# Patient Record
Sex: MALE
[De-identification: names, ages, dates, MRNs, and addresses within clinical notes are randomized per-mention and may not be internally consistent; named-entity substitution may affect disease eponyms.]

---

## 2024-05-24 ENCOUNTER — LABORATORY RESULT (OUTPATIENT)
Age: 49
End: 2024-05-24

## 2024-05-24 ENCOUNTER — NON-APPOINTMENT (OUTPATIENT)
Age: 49
End: 2024-05-24

## 2024-05-24 ENCOUNTER — APPOINTMENT (OUTPATIENT)
Dept: INTERNAL MEDICINE | Facility: CLINIC | Age: 49
End: 2024-05-24
Payer: MEDICAID

## 2024-05-24 VITALS
SYSTOLIC BLOOD PRESSURE: 116 MMHG | TEMPERATURE: 97.1 F | HEART RATE: 73 BPM | OXYGEN SATURATION: 99 % | BODY MASS INDEX: 26.66 KG/M2 | HEIGHT: 69 IN | WEIGHT: 180 LBS | DIASTOLIC BLOOD PRESSURE: 77 MMHG

## 2024-05-24 DIAGNOSIS — I83.90 ASYMPTOMATIC VARICOSE VEINS OF UNSPECIFIED LOWER EXTREMITY: ICD-10-CM

## 2024-05-24 DIAGNOSIS — Z86.19 PERSONAL HISTORY OF OTHER INFECTIOUS AND PARASITIC DISEASES: ICD-10-CM

## 2024-05-24 DIAGNOSIS — Z78.9 OTHER SPECIFIED HEALTH STATUS: ICD-10-CM

## 2024-05-24 DIAGNOSIS — Z82.49 FAMILY HISTORY OF ISCHEMIC HEART DISEASE AND OTHER DISEASES OF THE CIRCULATORY SYSTEM: ICD-10-CM

## 2024-05-24 DIAGNOSIS — N44.2 BENIGN CYST OF TESTIS: ICD-10-CM

## 2024-05-24 DIAGNOSIS — Z00.00 ENCOUNTER FOR GENERAL ADULT MEDICAL EXAMINATION W/OUT ABNORMAL FINDINGS: ICD-10-CM

## 2024-05-24 DIAGNOSIS — M25.571 PAIN IN RIGHT ANKLE AND JOINTS OF RIGHT FOOT: ICD-10-CM

## 2024-05-24 DIAGNOSIS — D17.20 BENIGN LIPOMATOUS NEOPLASM OF SKIN AND SUBCUTANEOUS TISSUE OF UNSPECIFIED LIMB: ICD-10-CM

## 2024-05-24 PROCEDURE — 99386 PREV VISIT NEW AGE 40-64: CPT

## 2024-05-24 PROCEDURE — 36415 COLL VENOUS BLD VENIPUNCTURE: CPT

## 2024-05-24 PROCEDURE — 93000 ELECTROCARDIOGRAM COMPLETE: CPT

## 2024-05-24 NOTE — PLAN
[FreeTextEntry1] : #HCM - CBC with differential, HIV, Hep C, CMP, hemoglobin A1c, vitamin B12, vitamin D, urine analysis, lipid panel, TFTs all ordered. Patient to be informed of results. - Prostate cancer screening discussed. PSA blood test has been ordered. - The patient has screened negative for depression and excessive alcohol use. - The patient has never used tobacco products. - The [place] Preventive Service Task Force recommends yearly lung cancer screening with LDCT for people who have a 20 pack-year or more smoking history and smoke now or have quit within the past 15 years and are between 50 and 80 years old. - Blood pressure as taken in the office today is within normal limits (<120/<80). - Colon cancer screening was discussed at today's visit and GI referral has been provided. - EKG performed in the office today is within normal limits. - Counseled on maintaining a healthy body weight. It has been estimated that up to one-third of cardiovascular disease deaths may be preventable by healthy diet and physical activity.  - Annual eye exam and q6 month dental exams recommended.  #Varicose veins - Advised that he may need to see Vascular surgery if he develops swelling or pain. Recommend compression stockings.  #Right ankle pain - PT recommended. Imaging is not indicated at this time.

## 2024-05-24 NOTE — PHYSICAL EXAM
[Normal] : normal rate, regular rhythm, normal S1 and S2 and no murmur heard [de-identified] : Varicose veins noted in the RLE; no swelling noted [de-identified] : Refused testicular exam [de-identified] : Lipomas noted in the RUE

## 2024-05-24 NOTE — HEALTH RISK ASSESSMENT
[Good] : ~his/her~  mood as  good [No] : In the past 12 months have you used drugs other than those required for medical reasons? No [No falls in past year] : Patient reported no falls in the past year [0] : 2) Feeling down, depressed, or hopeless: Not at all (0) [HIV Test offered] : HIV Test offered [Hepatitis C test offered] : Hepatitis C test offered [With Family] : lives with family [] :  [Sexually Active] : sexually active [Feels Safe at Home] : Feels safe at home [Smoke Detector] : smoke detector [Carbon Monoxide Detector] : carbon monoxide detector [Seat Belt] :  uses seat belt [Sunscreen] : uses sunscreen [Never] : Never [PHQ-2 Negative - No further assessment needed] : PHQ-2 Negative - No further assessment needed [de-identified] : None [de-identified] : None [Audit-CScore] : 0 [de-identified] : Plays soccer [de-identified] : Tries to avoid sweets [CBD6Qapbo] : 0 [Change in mental status noted] : No change in mental status noted [Language] : denies difficulty with language [Behavior] : denies difficulty with behavior [Learning/Retaining New Information] : denies difficulty learning/retaining new information [Handling Complex Tasks] : denies difficulty handling complex tasks [Reasoning] : denies difficulty with reasoning [Spatial Ability and Orientation] : denies difficulty with spatial ability and orientation [None] : None [Employed] : employed [High Risk Behavior] : no high risk behavior [Reports changes in hearing] : Reports no changes in hearing [Reports changes in vision] : Reports no changes in vision [Reports changes in dental health] : Reports no changes in dental health [TB Exposure] : is not being exposed to tuberculosis [Caregiver Concerns] : does not have caregiver concerns [ColonoscopyComments] : Never did colonoscopy

## 2024-05-24 NOTE — REVIEW OF SYSTEMS
[Negative] : Heme/Lymph [FreeTextEntry9] : Varicose veins of RLE [de-identified] : Lipomas on right arm

## 2024-05-24 NOTE — HISTORY OF PRESENT ILLNESS
[de-identified] : 48 M presenting for a CPE. He has been doing well. Main concern today are varicose veins of the right lower extremity. He states it does not swell up or hurt him.

## 2024-05-28 ENCOUNTER — APPOINTMENT (OUTPATIENT)
Dept: INTERNAL MEDICINE | Facility: CLINIC | Age: 49
End: 2024-05-28
Payer: MEDICAID

## 2024-05-28 DIAGNOSIS — E78.1 PURE HYPERGLYCERIDEMIA: ICD-10-CM

## 2024-05-28 DIAGNOSIS — E55.9 VITAMIN D DEFICIENCY, UNSPECIFIED: ICD-10-CM

## 2024-05-28 DIAGNOSIS — N20.0 CALCULUS OF KIDNEY: ICD-10-CM

## 2024-05-28 DIAGNOSIS — R73.03 PREDIABETES.: ICD-10-CM

## 2024-05-28 LAB
25(OH)D3 SERPL-MCNC: 25.4 NG/ML
ALBUMIN SERPL ELPH-MCNC: 4.7 G/DL
ALP BLD-CCNC: 94 U/L
ALT SERPL-CCNC: 29 U/L
ANION GAP SERPL CALC-SCNC: 14 MMOL/L
APPEARANCE: CLEAR
AST SERPL-CCNC: 21 U/L
BASOPHILS # BLD AUTO: 0.03 K/UL
BASOPHILS NFR BLD AUTO: 0.6 %
BILIRUB SERPL-MCNC: 0.4 MG/DL
BILIRUBIN URINE: NEGATIVE
BLOOD URINE: NEGATIVE
BUN SERPL-MCNC: 18 MG/DL
CALCIUM SERPL-MCNC: 9 MG/DL
CHLORIDE SERPL-SCNC: 105 MMOL/L
CHOLEST SERPL-MCNC: 184 MG/DL
CO2 SERPL-SCNC: 21 MMOL/L
COLOR: NORMAL
CREAT SERPL-MCNC: 1.12 MG/DL
EGFR: 82 ML/MIN/1.73M2
EOSINOPHIL # BLD AUTO: 0.16 K/UL
EOSINOPHIL NFR BLD AUTO: 3.3 %
ESTIMATED AVERAGE GLUCOSE: 117 MG/DL
GLUCOSE QUALITATIVE U: NEGATIVE MG/DL
GLUCOSE SERPL-MCNC: 100 MG/DL
HBA1C MFR BLD HPLC: 5.7 %
HCT VFR BLD CALC: 44.5 %
HCV AB SER QL: NONREACTIVE
HCV S/CO RATIO: 0.09 S/CO
HDLC SERPL-MCNC: 54 MG/DL
HGB BLD-MCNC: 15.3 G/DL
HIV1+2 AB SPEC QL IA.RAPID: NONREACTIVE
IMM GRANULOCYTES NFR BLD AUTO: 0.2 %
KETONES URINE: NEGATIVE MG/DL
LDLC SERPL CALC-MCNC: 92 MG/DL
LEUKOCYTE ESTERASE URINE: NEGATIVE
LYMPHOCYTES # BLD AUTO: 1.53 K/UL
LYMPHOCYTES NFR BLD AUTO: 31.1 %
MAN DIFF?: NORMAL
MCHC RBC-ENTMCNC: 31.7 PG
MCHC RBC-ENTMCNC: 34.4 GM/DL
MCV RBC AUTO: 92.1 FL
MONOCYTES # BLD AUTO: 0.47 K/UL
MONOCYTES NFR BLD AUTO: 9.6 %
NEUTROPHILS # BLD AUTO: 2.72 K/UL
NEUTROPHILS NFR BLD AUTO: 55.2 %
NITRITE URINE: NEGATIVE
NONHDLC SERPL-MCNC: 130 MG/DL
PH URINE: 6
PLATELET # BLD AUTO: 236 K/UL
POTASSIUM SERPL-SCNC: 4.1 MMOL/L
PROT SERPL-MCNC: 7.1 G/DL
PROTEIN URINE: 30 MG/DL
PSA SERPL-MCNC: 0.5 NG/ML
RBC # BLD: 4.83 M/UL
RBC # FLD: 12.9 %
SODIUM SERPL-SCNC: 140 MMOL/L
SPECIFIC GRAVITY URINE: >1.03
TRIGL SERPL-MCNC: 226 MG/DL
TSH SERPL-ACNC: 3.09 UIU/ML
UROBILINOGEN URINE: 1 MG/DL
VIT B12 SERPL-MCNC: 426 PG/ML
WBC # FLD AUTO: 4.92 K/UL

## 2024-05-28 PROCEDURE — 99443: CPT

## 2024-05-28 NOTE — HISTORY OF PRESENT ILLNESS
[Home] : at home, [unfilled] , at the time of the visit. [Medical Office: ([place] [facility])___] : at the medical office located in  [Verbal consent obtained from patient] : the patient, [unfilled] [de-identified] : 48 M presenting for blood work review. Noted to have elevated TG and A1c of 5.7%. He eats a lot of bread and rice. Never told he had this issue before.

## 2024-05-28 NOTE — PLAN
[FreeTextEntry1] : #Elevated TG - Counseled extensively on lifestyle changes such as limiting carbohydrate intake.   #Pre-diabetes - Counseled extensively on lifestyle changes such as limiting carbohydrate intake.   #Calcium oxalates in urine - Patient advised to drink plenty of water given that specific gravity was elevated as well.

## 2024-06-24 ENCOUNTER — APPOINTMENT (OUTPATIENT)
Age: 49
End: 2024-06-24

## 2024-06-30 ENCOUNTER — NON-APPOINTMENT (OUTPATIENT)
Age: 49
End: 2024-06-30

## 2024-07-01 ENCOUNTER — APPOINTMENT (OUTPATIENT)
Age: 49
End: 2024-07-01
Payer: MEDICAID

## 2024-07-01 VITALS
DIASTOLIC BLOOD PRESSURE: 71 MMHG | HEART RATE: 92 BPM | WEIGHT: 180 LBS | SYSTOLIC BLOOD PRESSURE: 111 MMHG | OXYGEN SATURATION: 96 % | BODY MASS INDEX: 26.66 KG/M2 | HEIGHT: 69 IN

## 2024-07-01 DIAGNOSIS — S83.421A SPRAIN OF LATERAL COLLATERAL LIGAMENT OF RIGHT KNEE, INITIAL ENCOUNTER: ICD-10-CM

## 2024-07-01 PROCEDURE — 99203 OFFICE O/P NEW LOW 30 MIN: CPT | Mod: 25

## 2024-07-01 PROCEDURE — 73564 X-RAY EXAM KNEE 4 OR MORE: CPT | Mod: RT

## 2024-08-12 ENCOUNTER — APPOINTMENT (OUTPATIENT)
Age: 49
End: 2024-08-12
Payer: MEDICAID

## 2024-08-12 VITALS
BODY MASS INDEX: 26.66 KG/M2 | OXYGEN SATURATION: 99 % | HEART RATE: 78 BPM | HEIGHT: 69 IN | DIASTOLIC BLOOD PRESSURE: 71 MMHG | SYSTOLIC BLOOD PRESSURE: 115 MMHG | WEIGHT: 180 LBS

## 2024-08-12 DIAGNOSIS — S93.491A SPRAIN OF OTHER LIGAMENT OF RIGHT ANKLE, INITIAL ENCOUNTER: ICD-10-CM

## 2024-08-12 DIAGNOSIS — M25.461 EFFUSION, RIGHT KNEE: ICD-10-CM

## 2024-08-12 DIAGNOSIS — M19.071 PRIMARY OSTEOARTHRITIS, RIGHT ANKLE AND FOOT: ICD-10-CM

## 2024-08-12 PROCEDURE — 99213 OFFICE O/P EST LOW 20 MIN: CPT | Mod: 25

## 2024-08-12 PROCEDURE — 73610 X-RAY EXAM OF ANKLE: CPT | Mod: RT

## 2024-08-12 NOTE — PHYSICAL EXAM
[de-identified] : Right lower extremity - Skin intact - Knee range of motion 0-130 - Knee stable varus valgus stress at 0 and 30 degrees - Negative Lachman negative anterior posterior drawer  -no pain with varus stress - Leg warm well-perfused - Sensation tact light touch distally - Palpable effusion right knee [de-identified] : X-rays of the right ankle obtained 3 views: Demonstrate no fractures or dislocations.  There is some anterior bone spurs off the tibia that may be causing impingement.  Consistent with mild to moderate arthritic changes

## 2024-08-12 NOTE — ASSESSMENT
[FreeTextEntry1] : 48-year-old male with right knee effusion and right knee LCL sprain and right ankle ATFL sprain and exacerbation of right ankle arthritis - Long discussion with patient about his injuries.  Discussed with patient that retches right knee he likely reaggravated his LCL sprain potentially aggravated underlying arthritis.  Patient has been in physical therapy was having some improvement has had a step back.  Due to the recurrence of the pain failed physical therapy would recommend MRI of the right knee to further evaluate the integrity of his ligaments and possible underlying arthritic lesions. - Regards to patient's ankle he has history of multiple ankle sprains in the past.  He has tenderness of the ATFL.  He has evidence of some arthritic changes on x-ray.  Recommend MRI of the right ankle to further evaluate for any osteochondral lesions and to evaluate the degree of ankle arthritis.  Patient will be scheduled for MRIs.  Follow-up after MRIs are completed. - Patient to continue anti-inflammatories for pain.  Ice elevation and Ace wrapping as well.

## 2024-08-12 NOTE — HISTORY OF PRESENT ILLNESS
[de-identified] : 48-year-old male presents with right knee pain.  Patient states that about 3 weeks ago while playing soccer cutting he felt a crack in the outside of his knee.  Had minimal swelling after that.  He was limping initially.  His pain has improved since injury.  He has been taking ibuprofen and Tylenol.  Patient has a history of a left ACL tear and he underwent reconstruction in 2009 8/12/2024: Patient returns today with recurrence of right knee pain.  He is overall feeling better and started to play soccer again.  Since playing soccer again about a week or so ago he developed discomfort in his right knee.  Reports an effusion.  He also complains of pain in his right ankle.  Has complaints of swelling in his ankle.

## 2024-09-10 ENCOUNTER — APPOINTMENT (OUTPATIENT)
Dept: INTERNAL MEDICINE | Facility: CLINIC | Age: 49
End: 2024-09-10
Payer: MEDICAID

## 2024-09-10 ENCOUNTER — TRANSCRIPTION ENCOUNTER (OUTPATIENT)
Age: 49
End: 2024-09-10

## 2024-09-10 VITALS
HEIGHT: 69 IN | TEMPERATURE: 97.2 F | DIASTOLIC BLOOD PRESSURE: 73 MMHG | HEART RATE: 69 BPM | WEIGHT: 180 LBS | SYSTOLIC BLOOD PRESSURE: 102 MMHG | BODY MASS INDEX: 26.66 KG/M2 | OXYGEN SATURATION: 99 %

## 2024-09-10 DIAGNOSIS — F95.9 TIC DISORDER, UNSPECIFIED: ICD-10-CM

## 2024-09-10 DIAGNOSIS — Z23 ENCOUNTER FOR IMMUNIZATION: ICD-10-CM

## 2024-09-10 PROCEDURE — 99213 OFFICE O/P EST LOW 20 MIN: CPT | Mod: 25

## 2024-09-10 PROCEDURE — 90656 IIV3 VACC NO PRSV 0.5 ML IM: CPT

## 2024-09-10 PROCEDURE — G2211 COMPLEX E/M VISIT ADD ON: CPT | Mod: NC

## 2024-09-10 PROCEDURE — 90471 IMMUNIZATION ADMIN: CPT

## 2024-09-10 NOTE — HISTORY OF PRESENT ILLNESS
[Spouse] : spouse [de-identified] :  48 M presenting for a follow up visit. He has been actively changing his diet and was playing soccer until he injured his knee. His wife points out that he sometimes starts to shake his legs during a conversation as well as his eyes. He denies having this issue at night time.

## 2024-09-10 NOTE — HEALTH RISK ASSESSMENT
[No] : In the past 12 months have you used drugs other than those required for medical reasons? No [No falls in past year] : Patient reported no falls in the past year [Never] : Never [de-identified] : no [de-identified] : ORTHO [de-identified] : pt is active and doing physical therapy for knee [de-identified] : pt is limiting carbs and sweets

## 2024-09-10 NOTE — PHYSICAL EXAM
[Normal] : no acute distress, well nourished, well developed and well-appearing [de-identified] : TTP in the right knee

## 2024-09-11 ENCOUNTER — APPOINTMENT (OUTPATIENT)
Dept: INTERNAL MEDICINE | Facility: CLINIC | Age: 49
End: 2024-09-11
Payer: MEDICAID

## 2024-09-11 DIAGNOSIS — R73.03 PREDIABETES.: ICD-10-CM

## 2024-09-11 DIAGNOSIS — E78.1 PURE HYPERGLYCERIDEMIA: ICD-10-CM

## 2024-09-11 LAB
CHOLEST SERPL-MCNC: 180 MG/DL
ESTIMATED AVERAGE GLUCOSE: 117 MG/DL
HBA1C MFR BLD HPLC: 5.7 %
HDLC SERPL-MCNC: 64 MG/DL
LDLC SERPL CALC-MCNC: 97 MG/DL
NONHDLC SERPL-MCNC: 116 MG/DL
TRIGL SERPL-MCNC: 106 MG/DL

## 2024-09-11 PROCEDURE — 99441: CPT | Mod: 93

## 2024-09-11 NOTE — HISTORY OF PRESENT ILLNESS
[Home] : at home, [unfilled] , at the time of the visit. [Medical Office: ([facility])___] : at the medical office located in  [Verbal consent obtained from patient] : the patient, [unfilled] [de-identified] : 48 M presenting for review of blood work. Lab results reviewed with patient over the phone. Patient verbalized understanding and all questions answered

## 2024-09-23 ENCOUNTER — APPOINTMENT (OUTPATIENT)
Age: 49
End: 2024-09-23
Payer: MEDICAID

## 2024-09-23 VITALS
DIASTOLIC BLOOD PRESSURE: 77 MMHG | WEIGHT: 181 LBS | HEIGHT: 69 IN | OXYGEN SATURATION: 96 % | BODY MASS INDEX: 26.81 KG/M2 | SYSTOLIC BLOOD PRESSURE: 120 MMHG | HEART RATE: 70 BPM

## 2024-09-23 DIAGNOSIS — M19.071 PRIMARY OSTEOARTHRITIS, RIGHT ANKLE AND FOOT: ICD-10-CM

## 2024-09-23 DIAGNOSIS — S93.491A SPRAIN OF OTHER LIGAMENT OF RIGHT ANKLE, INITIAL ENCOUNTER: ICD-10-CM

## 2024-09-23 DIAGNOSIS — S83.511A SPRAIN OF ANTERIOR CRUCIATE LIGAMENT OF RIGHT KNEE, INITIAL ENCOUNTER: ICD-10-CM

## 2024-09-23 PROCEDURE — 99214 OFFICE O/P EST MOD 30 MIN: CPT

## 2024-09-24 PROBLEM — S83.511A RUPTURE OF ANTERIOR CRUCIATE LIGAMENT OF RIGHT KNEE, INITIAL ENCOUNTER: Status: ACTIVE | Noted: 2024-09-24

## 2024-09-24 NOTE — HISTORY OF PRESENT ILLNESS
[de-identified] : - Summary : Mr. Joshua, a 48-year-old male, presents today with a history of left Anterior Cruciate Ligament (ACL) tear and recent right knee pain. His MRI was done at [facility] and he's here today for the result's discussion. He's been attending physical therapy and reports improvements. - History of Present Illness : Daniel reports that post the injury, his knee condition is stable and improved due to physical therapy. Repeated soccer injuries have led to worsening of his knee condition. There was no specific moment of 'pop' or instant swelling typically associated with an ACL tear, this seemed unusual to him. He has a history of an ACL tear on the opposite knee that was replaced with a cadaver's ACL as part of a reconstruction surgery and he identifies a noticeable difference in between his knees despite the lack of severe symptoms. There are sporadic instances of pain in his ankle as well. - Past Medical History : His medical history is significant for left ACL tear which was managed surgically. He has a sports-related injury background due to his engagement with soccer. - Past Surgical History : He underwent a successful ACL reconstruction surgery approximately 9 years ago. - Social History  - Plays Soccer regularly

## 2024-09-24 NOTE — PHYSICAL EXAM
[de-identified] : On physical examination, the patient's right knee demonstrates a little laxity in the anterior drawer sign with a definite endpoint, suggesting that some degree of stability remains. There is some laxity when compared to the left knee but is still within functional limits. [de-identified] : On today's visit, we reviewed the MRI of his knee and ankle. My independent interpretation; The findings confirm a full-thickness rupture of his ACL. There is also observed some cartilage damage due to wear and tear, no meniscal tear, and his LCL looks intact. There's also some degree of osteoarthritis noted in the ankle. In addition to this, there's evidence of a re-torn ligament in his ankle( ATFL) and consequently mild to moderate arthropathy of the tibial-tailor joint. Further, an osteophyte or a bone spur was observed on the anterior aspect of the area of arthritis in his ankle.

## 2024-09-24 NOTE — ASSESSMENT
[FreeTextEntry1] : - [Right Knee Pain - Torn ACL] : The ACL of the patient's right knee is completely torn. However, despite the presence of this issue, the patient does not show acute instability or disability as the main problem. In fact, the patient has managed to maintain physical activity and the joint is relatively steady. Hence, immediate surgical intervention may not be necessary in this case. - Continue Physical Therapy: The patient is advised to continue with his physical therapy sessions to help strengthen the surrounding muscles and lend further support to the knee.  - Wear a Brace: It is recommended that the patient wear a brace during physical activity, such as soccer, which will lend additional support.  - Follow-Up in three months: The patient is scheduled for a follow-up in three months to assess the progress.  - Second Opinion: To ensure the best course of action, the case will be shared with a sports surgeon consultant for a second review and opinion.  - [Ankle Pain - Arthritic changes] : The patient has some arthritis changes in the ankle and a re-torn ligament. While he reports some sensitivity in this area, it does not seem to be limiting his daily functionality significantly. - Observe for Progression: Continue monitoring the symptoms and if pain or instability increases significantly, consider intervention.  - Potential Interventions: If symptoms worsen, possible interventions may include ligament reconstruction and removal of the osteophyte. if this occurs will send to Dr Peralta for consultation  - Physical Therapy for the Ankle: After the completion of the current therapy course focused on the knee, a new course targeting the ankle will be started.

## 2024-09-26 ENCOUNTER — APPOINTMENT (OUTPATIENT)
Age: 49
End: 2024-09-26

## 2024-11-27 ENCOUNTER — APPOINTMENT (OUTPATIENT)
Dept: GASTROENTEROLOGY | Facility: CLINIC | Age: 49
End: 2024-11-27

## 2024-12-12 ENCOUNTER — NON-APPOINTMENT (OUTPATIENT)
Age: 49
End: 2024-12-12

## 2024-12-12 ENCOUNTER — APPOINTMENT (OUTPATIENT)
Dept: INTERNAL MEDICINE | Facility: CLINIC | Age: 49
End: 2024-12-12
Payer: MEDICAID

## 2024-12-12 VITALS
WEIGHT: 181 LBS | TEMPERATURE: 96.7 F | HEART RATE: 73 BPM | HEIGHT: 69 IN | SYSTOLIC BLOOD PRESSURE: 106 MMHG | BODY MASS INDEX: 26.81 KG/M2 | DIASTOLIC BLOOD PRESSURE: 73 MMHG

## 2024-12-12 DIAGNOSIS — R73.03 PREDIABETES.: ICD-10-CM

## 2024-12-12 LAB — HBA1C MFR BLD HPLC: 5.6

## 2024-12-12 PROCEDURE — 83036 HEMOGLOBIN GLYCOSYLATED A1C: CPT | Mod: QW

## 2024-12-12 PROCEDURE — 99213 OFFICE O/P EST LOW 20 MIN: CPT | Mod: 25

## 2024-12-13 ENCOUNTER — APPOINTMENT (OUTPATIENT)
Dept: INTERNAL MEDICINE | Facility: CLINIC | Age: 49
End: 2024-12-13

## 2024-12-23 ENCOUNTER — APPOINTMENT (OUTPATIENT)
Age: 49
End: 2024-12-23

## 2025-01-03 ENCOUNTER — APPOINTMENT (OUTPATIENT)
Dept: GASTROENTEROLOGY | Facility: CLINIC | Age: 50
End: 2025-01-03

## 2025-01-14 ENCOUNTER — APPOINTMENT (OUTPATIENT)
Dept: GASTROENTEROLOGY | Facility: CLINIC | Age: 50
End: 2025-01-14
Payer: MEDICAID

## 2025-01-14 VITALS
DIASTOLIC BLOOD PRESSURE: 79 MMHG | TEMPERATURE: 97.1 F | HEIGHT: 69 IN | HEART RATE: 82 BPM | OXYGEN SATURATION: 100 % | BODY MASS INDEX: 27.55 KG/M2 | WEIGHT: 186 LBS | SYSTOLIC BLOOD PRESSURE: 118 MMHG

## 2025-01-14 DIAGNOSIS — Z12.11 ENCOUNTER FOR SCREENING FOR MALIGNANT NEOPLASM OF COLON: ICD-10-CM

## 2025-01-14 PROCEDURE — 99203 OFFICE O/P NEW LOW 30 MIN: CPT

## 2025-01-14 RX ORDER — POLYETHYLENE GLYCOL 3350 AND ELECTROLYTES WITH LEMON FLAVOR 236; 22.74; 6.74; 5.86; 2.97 G/4L; G/4L; G/4L; G/4L; G/4L
236 POWDER, FOR SOLUTION ORAL
Qty: 1 | Refills: 0 | Status: ACTIVE | COMMUNITY
Start: 2025-01-14 | End: 1900-01-01

## 2025-01-14 RX ORDER — DICLOFENAC SODIUM 75 MG/1
75 TABLET, DELAYED RELEASE ORAL
Refills: 0 | Status: ACTIVE | COMMUNITY

## 2025-01-23 ENCOUNTER — TRANSCRIPTION ENCOUNTER (OUTPATIENT)
Age: 50
End: 2025-01-23

## 2025-01-23 ENCOUNTER — OUTPATIENT (OUTPATIENT)
Dept: OUTPATIENT SERVICES | Facility: HOSPITAL | Age: 50
LOS: 1 days | End: 2025-01-23
Payer: MEDICAID

## 2025-01-23 ENCOUNTER — APPOINTMENT (OUTPATIENT)
Dept: GASTROENTEROLOGY | Facility: HOSPITAL | Age: 50
End: 2025-01-23

## 2025-01-23 VITALS
DIASTOLIC BLOOD PRESSURE: 75 MMHG | TEMPERATURE: 98 F | WEIGHT: 186.07 LBS | OXYGEN SATURATION: 98 % | HEART RATE: 66 BPM | RESPIRATION RATE: 14 BRPM | SYSTOLIC BLOOD PRESSURE: 124 MMHG | HEIGHT: 69 IN

## 2025-01-23 VITALS
RESPIRATION RATE: 17 BRPM | OXYGEN SATURATION: 99 % | DIASTOLIC BLOOD PRESSURE: 72 MMHG | SYSTOLIC BLOOD PRESSURE: 114 MMHG | HEART RATE: 68 BPM

## 2025-01-23 DIAGNOSIS — Z87.39 PERSONAL HISTORY OF OTHER DISEASES OF THE MUSCULOSKELETAL SYSTEM AND CONNECTIVE TISSUE: Chronic | ICD-10-CM

## 2025-01-23 DIAGNOSIS — Z12.11 ENCOUNTER FOR SCREENING FOR MALIGNANT NEOPLASM OF COLON: ICD-10-CM

## 2025-01-23 DIAGNOSIS — Z98.890 OTHER SPECIFIED POSTPROCEDURAL STATES: Chronic | ICD-10-CM

## 2025-01-23 PROCEDURE — 45378 DIAGNOSTIC COLONOSCOPY: CPT

## 2025-01-23 PROCEDURE — G0121: CPT

## 2025-01-23 NOTE — ASU DISCHARGE PLAN (ADULT/PEDIATRIC) - NS MD DC FALL RISK RISK
For information on Fall & Injury Prevention, visit: [email or web address] OR  [email or web address] OR  https://www.cdc.gov/steadi/patient.html

## 2025-01-23 NOTE — ASU DISCHARGE PLAN (ADULT/PEDIATRIC) - FINANCIAL ASSISTANCE
[facility] provides services at a reduced cost to those who are determined to be eligible through [facility]’s financial assistance program. Information regarding [facility]’s financial assistance program can be found by going to [email or web address] or by calling [phone].

## 2025-02-10 ENCOUNTER — APPOINTMENT (OUTPATIENT)
Age: 50
End: 2025-02-10

## 2025-02-10 VITALS
HEART RATE: 70 BPM | HEIGHT: 69 IN | WEIGHT: 186 LBS | OXYGEN SATURATION: 96 % | SYSTOLIC BLOOD PRESSURE: 116 MMHG | BODY MASS INDEX: 27.55 KG/M2 | DIASTOLIC BLOOD PRESSURE: 73 MMHG

## 2025-02-10 PROCEDURE — 99213 OFFICE O/P EST LOW 20 MIN: CPT

## 2025-03-13 ENCOUNTER — APPOINTMENT (OUTPATIENT)
Dept: INTERNAL MEDICINE | Facility: CLINIC | Age: 50
End: 2025-03-13
Payer: MEDICAID

## 2025-03-13 ENCOUNTER — RESULT CHARGE (OUTPATIENT)
Age: 50
End: 2025-03-13

## 2025-03-13 VITALS
HEIGHT: 69 IN | OXYGEN SATURATION: 98 % | TEMPERATURE: 96.8 F | HEART RATE: 64 BPM | BODY MASS INDEX: 27.55 KG/M2 | DIASTOLIC BLOOD PRESSURE: 67 MMHG | SYSTOLIC BLOOD PRESSURE: 105 MMHG | WEIGHT: 186 LBS

## 2025-03-13 DIAGNOSIS — R73.03 PREDIABETES.: ICD-10-CM

## 2025-03-13 DIAGNOSIS — Z23 ENCOUNTER FOR IMMUNIZATION: ICD-10-CM

## 2025-03-13 DIAGNOSIS — M25.571 PAIN IN RIGHT ANKLE AND JOINTS OF RIGHT FOOT: ICD-10-CM

## 2025-03-13 PROCEDURE — 99214 OFFICE O/P EST MOD 30 MIN: CPT | Mod: 25

## 2025-03-13 PROCEDURE — 90471 IMMUNIZATION ADMIN: CPT

## 2025-03-13 PROCEDURE — 83036 HEMOGLOBIN GLYCOSYLATED A1C: CPT | Mod: QW

## 2025-03-13 PROCEDURE — G0447 BEHAVIOR COUNSEL OBESITY 15M: CPT | Mod: 59

## 2025-03-13 PROCEDURE — 90715 TDAP VACCINE 7 YRS/> IM: CPT

## 2025-03-21 ENCOUNTER — APPOINTMENT (OUTPATIENT)
Dept: PULMONOLOGY | Facility: CLINIC | Age: 50
End: 2025-03-21

## 2025-05-01 ENCOUNTER — APPOINTMENT (OUTPATIENT)
Dept: INTERNAL MEDICINE | Facility: CLINIC | Age: 50
End: 2025-05-01

## 2025-05-27 ENCOUNTER — APPOINTMENT (OUTPATIENT)
Dept: INTERNAL MEDICINE | Facility: CLINIC | Age: 50
End: 2025-05-27

## 2025-05-28 ENCOUNTER — APPOINTMENT (OUTPATIENT)
Dept: INTERNAL MEDICINE | Facility: CLINIC | Age: 50
End: 2025-05-28
Payer: MEDICAID

## 2025-05-28 ENCOUNTER — NON-APPOINTMENT (OUTPATIENT)
Age: 50
End: 2025-05-28

## 2025-05-28 VITALS
WEIGHT: 184 LBS | SYSTOLIC BLOOD PRESSURE: 109 MMHG | HEART RATE: 66 BPM | BODY MASS INDEX: 27.25 KG/M2 | DIASTOLIC BLOOD PRESSURE: 72 MMHG | HEIGHT: 69 IN | TEMPERATURE: 96.7 F

## 2025-05-28 DIAGNOSIS — I83.90 ASYMPTOMATIC VARICOSE VEINS OF UNSPECIFIED LOWER EXTREMITY: ICD-10-CM

## 2025-05-28 DIAGNOSIS — Z00.00 ENCOUNTER FOR GENERAL ADULT MEDICAL EXAMINATION W/OUT ABNORMAL FINDINGS: ICD-10-CM

## 2025-05-28 DIAGNOSIS — E78.1 PURE HYPERGLYCERIDEMIA: ICD-10-CM

## 2025-05-28 DIAGNOSIS — B35.1 TINEA UNGUIUM: ICD-10-CM

## 2025-05-28 PROCEDURE — G0447 BEHAVIOR COUNSEL OBESITY 15M: CPT | Mod: 59

## 2025-05-28 PROCEDURE — 99396 PREV VISIT EST AGE 40-64: CPT | Mod: 25

## 2025-05-28 PROCEDURE — G0537: CPT

## 2025-05-28 PROCEDURE — 99214 OFFICE O/P EST MOD 30 MIN: CPT | Mod: 25

## 2025-05-28 PROCEDURE — 93000 ELECTROCARDIOGRAM COMPLETE: CPT

## 2025-05-28 RX ORDER — CICLOPIROX 2.28 G/ML
8 SOLUTION TOPICAL
Qty: 1 | Refills: 0 | Status: ACTIVE | COMMUNITY
Start: 2025-05-28 | End: 1900-01-01

## 2025-05-29 LAB
25(OH)D3 SERPL-MCNC: 23.4 NG/ML
ALBUMIN SERPL ELPH-MCNC: 5 G/DL
ALP BLD-CCNC: 96 U/L
ALT SERPL-CCNC: 27 U/L
ANION GAP SERPL CALC-SCNC: 13 MMOL/L
APPEARANCE: CLEAR
AST SERPL-CCNC: 22 U/L
BASOPHILS # BLD AUTO: 0.03 K/UL
BASOPHILS NFR BLD AUTO: 0.5 %
BILIRUB SERPL-MCNC: 0.8 MG/DL
BILIRUBIN URINE: NEGATIVE
BLOOD URINE: NEGATIVE
BUN SERPL-MCNC: 13 MG/DL
CALCIUM SERPL-MCNC: 9.4 MG/DL
CHLORIDE SERPL-SCNC: 102 MMOL/L
CHOLEST SERPL-MCNC: 203 MG/DL
CO2 SERPL-SCNC: 23 MMOL/L
COLOR: YELLOW
CREAT SERPL-MCNC: 1 MG/DL
EGFRCR SERPLBLD CKD-EPI 2021: 93 ML/MIN/1.73M2
EOSINOPHIL # BLD AUTO: 0.14 K/UL
EOSINOPHIL NFR BLD AUTO: 2.5 %
ESTIMATED AVERAGE GLUCOSE: 123 MG/DL
FOLATE SERPL-MCNC: >20 NG/ML
GLUCOSE QUALITATIVE U: NEGATIVE MG/DL
GLUCOSE SERPL-MCNC: 103 MG/DL
HBA1C MFR BLD HPLC: 5.9 %
HCT VFR BLD CALC: 48.3 %
HDLC SERPL-MCNC: 63 MG/DL
HGB BLD-MCNC: 16.3 G/DL
IMM GRANULOCYTES NFR BLD AUTO: 0.2 %
KETONES URINE: NEGATIVE MG/DL
LDLC SERPL-MCNC: 122 MG/DL
LEUKOCYTE ESTERASE URINE: NEGATIVE
LYMPHOCYTES # BLD AUTO: 2.01 K/UL
LYMPHOCYTES NFR BLD AUTO: 36.3 %
MAN DIFF?: NORMAL
MCHC RBC-ENTMCNC: 30.6 PG
MCHC RBC-ENTMCNC: 33.7 G/DL
MCV RBC AUTO: 90.6 FL
MONOCYTES # BLD AUTO: 0.43 K/UL
MONOCYTES NFR BLD AUTO: 7.8 %
NEUTROPHILS # BLD AUTO: 2.92 K/UL
NEUTROPHILS NFR BLD AUTO: 52.7 %
NITRITE URINE: NEGATIVE
NONHDLC SERPL-MCNC: 139 MG/DL
PH URINE: 6.5
PLATELET # BLD AUTO: 216 K/UL
POTASSIUM SERPL-SCNC: 4.1 MMOL/L
PROT SERPL-MCNC: 7.5 G/DL
PROTEIN URINE: NEGATIVE MG/DL
PSA SERPL-MCNC: 0.47 NG/ML
RBC # BLD: 5.33 M/UL
RBC # FLD: 12.7 %
SODIUM SERPL-SCNC: 139 MMOL/L
SPECIFIC GRAVITY URINE: 1.02
TRIGL SERPL-MCNC: 101 MG/DL
TSH SERPL-ACNC: 3.41 UIU/ML
UROBILINOGEN URINE: 0.2 MG/DL
VIT B12 SERPL-MCNC: 581 PG/ML
WBC # FLD AUTO: 5.54 K/UL

## 2025-09-04 ENCOUNTER — APPOINTMENT (OUTPATIENT)
Dept: INTERNAL MEDICINE | Facility: CLINIC | Age: 50
End: 2025-09-04
Payer: MEDICAID

## 2025-09-04 VITALS
SYSTOLIC BLOOD PRESSURE: 119 MMHG | WEIGHT: 184 LBS | HEART RATE: 74 BPM | BODY MASS INDEX: 27.25 KG/M2 | DIASTOLIC BLOOD PRESSURE: 77 MMHG | HEIGHT: 69 IN

## 2025-09-04 DIAGNOSIS — R25.1 TREMOR, UNSPECIFIED: ICD-10-CM

## 2025-09-04 DIAGNOSIS — R73.03 PREDIABETES.: ICD-10-CM

## 2025-09-04 PROCEDURE — 99214 OFFICE O/P EST MOD 30 MIN: CPT | Mod: 25

## 2025-09-04 PROCEDURE — G0447 BEHAVIOR COUNSEL OBESITY 15M: CPT | Mod: 59

## 2025-09-04 PROCEDURE — 83036 HEMOGLOBIN GLYCOSYLATED A1C: CPT | Mod: QW

## (undated) DEVICE — BASIN EMESIS 10IN GRADUATED MAUVE

## (undated) DEVICE — BIOPSY FORCEP COLD DISP

## (undated) DEVICE — GOWN LG

## (undated) DEVICE — ELCTR GROUNDING PAD ADULT COVIDIEN

## (undated) DEVICE — DRSG BANDAID 0.75X3"

## (undated) DEVICE — CONTAINER FORMALIN 10% 20ML

## (undated) DEVICE — PACK IV START WITH CHG

## (undated) DEVICE — DRSG CURITY GAUZE SPONGE 4 X 4" 12-PLY NON-STERILE

## (undated) DEVICE — DRSG 2X2

## (undated) DEVICE — Device

## (undated) DEVICE — SALIVA EJECTOR (BLUE)

## (undated) DEVICE — CATH IV SAFE BC 22G X 1" (BLUE)

## (undated) DEVICE — LUBRICATING JELLY HR ONE SHOT 3G

## (undated) DEVICE — TUBING MEDI-VAC W MAXIGRIP CONNECTORS 1/4"X6'

## (undated) DEVICE — TUBING SUCTION NONCONDUCTIVE 6MM X 12FT

## (undated) DEVICE — BIOPSY FORCEP RADIAL JAW 4 STANDARD WITH NEEDLE

## (undated) DEVICE — ELCTR ECG CONDUCTIVE ADHESIVE

## (undated) DEVICE — TUBING IV SET GRAVITY 3Y 100" MACRO

## (undated) DEVICE — SOLIDIFIER 1200CC

## (undated) DEVICE — TUBING ENDO EXT OLYMPUS 160 24HR USE GI